# Patient Record
Sex: FEMALE | URBAN - METROPOLITAN AREA
[De-identification: names, ages, dates, MRNs, and addresses within clinical notes are randomized per-mention and may not be internally consistent; named-entity substitution may affect disease eponyms.]

---

## 2020-02-28 ENCOUNTER — APPOINTMENT (OUTPATIENT)
Age: 65
Setting detail: DERMATOLOGY
End: 2020-02-28

## 2020-02-28 VITALS — HEIGHT: 62 IN | WEIGHT: 120 LBS

## 2020-02-28 DIAGNOSIS — L57.8 OTHER SKIN CHANGES DUE TO CHRONIC EXPOSURE TO NONIONIZING RADIATION: ICD-10-CM

## 2020-02-28 DIAGNOSIS — L82.0 INFLAMED SEBORRHEIC KERATOSIS: ICD-10-CM

## 2020-02-28 DIAGNOSIS — D22 MELANOCYTIC NEVI: ICD-10-CM

## 2020-02-28 PROBLEM — I10 ESSENTIAL (PRIMARY) HYPERTENSION: Status: ACTIVE | Noted: 2020-02-28

## 2020-02-28 PROBLEM — D22.5 MELANOCYTIC NEVI OF TRUNK: Status: ACTIVE | Noted: 2020-02-28

## 2020-02-28 PROBLEM — M12.9 ARTHROPATHY, UNSPECIFIED: Status: ACTIVE | Noted: 2020-02-28

## 2020-02-28 PROCEDURE — 99203 OFFICE O/P NEW LOW 30 MIN: CPT | Mod: 25

## 2020-02-28 PROCEDURE — OTHER COUNSELING: OTHER

## 2020-02-28 PROCEDURE — OTHER MIPS QUALITY: OTHER

## 2020-02-28 PROCEDURE — 17111 DESTRUCT LESION 15 OR MORE: CPT

## 2020-02-28 PROCEDURE — OTHER TREATMENT REGIMEN: OTHER

## 2020-02-28 PROCEDURE — OTHER SUNSCREEN RECOMMENDATIONS: OTHER

## 2020-02-28 PROCEDURE — OTHER REASSURANCE: OTHER

## 2020-02-28 PROCEDURE — OTHER LIQUID NITROGEN: OTHER

## 2020-02-28 ASSESSMENT — LOCATION DETAILED DESCRIPTION DERM
LOCATION DETAILED: LEFT INFERIOR MEDIAL FOREHEAD
LOCATION DETAILED: RIGHT MEDIAL UPPER BACK
LOCATION DETAILED: RIGHT SUPERIOR LATERAL MIDBACK
LOCATION DETAILED: LEFT INFERIOR UPPER BACK
LOCATION DETAILED: LEFT LATERAL ABDOMEN
LOCATION DETAILED: LEFT ANTERIOR PROXIMAL THIGH
LOCATION DETAILED: LEFT MID-UPPER BACK
LOCATION DETAILED: SUBXIPHOID
LOCATION DETAILED: MID TRAPEZIAL NECK
LOCATION DETAILED: RIGHT INFERIOR UPPER BACK
LOCATION DETAILED: RIGHT RIB CAGE
LOCATION DETAILED: RIGHT MID-UPPER BACK

## 2020-02-28 ASSESSMENT — LOCATION SIMPLE DESCRIPTION DERM
LOCATION SIMPLE: TRAPEZIAL NECK
LOCATION SIMPLE: ABDOMEN
LOCATION SIMPLE: LEFT FOREHEAD
LOCATION SIMPLE: LEFT UPPER BACK
LOCATION SIMPLE: RIGHT LOWER BACK
LOCATION SIMPLE: LEFT THIGH
LOCATION SIMPLE: RIGHT UPPER BACK

## 2020-02-28 ASSESSMENT — LOCATION ZONE DERM
LOCATION ZONE: NECK
LOCATION ZONE: TRUNK
LOCATION ZONE: FACE
LOCATION ZONE: LEG

## 2020-02-28 NOTE — HPI: EVALUATION OF SKIN LESION(S)
What Type Of Note Output Would You Prefer (Optional)?: Bullet Format
How Severe Are Your Spot(S)?: mild
Have Your Spot(S) Been Treated In The Past?: has not been treated
Hpi Title: Evaluation of Skin Lesions
Additional History: TBE. Patient here to establish care. She was a previous patient of Dr. Washburn. She would like a good comprehensive skin exam. She also has multiple irritated seborrheic keratoses that she would like treated. She does spend time in the sun but is fairly good about sunscreen use and sun protection. No previous history of pre-cancer or skin cancer

## 2020-02-28 NOTE — PROCEDURE: TREATMENT REGIMEN
Plan: Treated all visible lesions with cryotherapy. Follow up annually
Initiate Treatment: Liquid Nitrogen Multi
Detail Level: Simple

## 2020-02-28 NOTE — PROCEDURE: REASSURANCE
Additional Note: Normal FBSE, patient counseled regarding sunscreen and how to use properly. Follow up annually
Detail Level: Generalized
Hide Include Location In Plan Question?: No
Include Location In Plan?: Yes
Additional Note: Normal FBSE, no abnormal moles or suspicious lesions. Follow up if lesions change or becomes painful

## 2020-02-28 NOTE — PROCEDURE: LIQUID NITROGEN
Total Number Of Lesions Treated: 24
Include Z78.9 (Other Specified Conditions Influencing Health Status) As An Associated Diagnosis?: No
Number Of Freeze-Thaw Cycles: 1 freeze-thaw cycle
Aperture Size (Optional): C
Duration Of Freeze Thaw-Cycle (Seconds): 5
Post-Care Instructions: I reviewed with the patient in detail post-care instructions. Patient is to wear sunprotection, and avoid picking at any of the treated lesions. Pt may apply Vaseline to crusted or scabbing areas.
Medical Necessity Information: It is in your best interest to select a reason for this procedure from the list below. All of these items fulfill various CMS LCD requirements except the new and changing color options.
Detail Level: Simple
Medical Necessity Clause: This procedure was medically necessary because the lesions that were treated were: these are increasing in size and causing moderate irritation
Consent: The patient's consent was obtained including but not limited to risks of crusting, scabbing, blistering, scarring, darker or lighter pigmentary change, recurrence, incomplete removal and infection.
Render Post Care In The Note?: yes